# Patient Record
(demographics unavailable — no encounter records)

---

## 2024-10-15 NOTE — CARDIOLOGY SUMMARY
[de-identified] : December 23, 2022 sinus rhythm October 15, 2024 sinus rhythm low voltage precordium [de-identified] : 2023 sinus rhythm [de-identified] : January 2023 nuclear stress [de-identified] : December 2022 normal LVEF

## 2024-10-15 NOTE — HISTORY OF PRESENT ILLNESS
[FreeTextEntry1] :  She is diagnosed with hypertension hyperlipidemia and diabetes.  Seen today with her .    Physically she is feeling well.  Occasional left shoulder discomfort when carrying a bag in her left shoulder but otherwise when she walks she has no discomforts.  Shortness of breath only if climbing 3-4 flights of stairs.

## 2024-10-15 NOTE — PHYSICAL EXAM
[Well Developed] : well developed [Well Nourished] : well nourished [No Edema] : no edema [Normal PT B/L] : normal PT B/L [Normal] : moves all extremities, no focal deficits, normal speech [Alert and Oriented] : alert and oriented

## 2024-10-15 NOTE — DISCUSSION/SUMMARY
[Patient] : the patient [Risks] : risks [Benefits] : benefits [Alternatives] : alternatives [___ Month(s)] : in [unfilled] month(s) [FreeTextEntry1] : From cardiac viewpoint advised regarding diet exercise and weight loss.  Maintain Diovan, amlodipine and Zetia.  Diabetic management per Dr. Morris.  Questions addressed with patient and . [EKG obtained to assist in diagnosis and management of assessed problem(s)] : EKG obtained to assist in diagnosis and management of assessed problem(s)

## 2025-03-24 NOTE — HEALTH RISK ASSESSMENT
[Excellent] : ~his/her~  mood as  excellent [Yes] : Yes [2 - 3 times a week (3 pts)] : 2 - 3  times a week (3 points) [1 or 2 (0 pts)] : 1 or 2 (0 points) [Never (0 pts)] : Never (0 points) [No] : In the past 12 months have you used drugs other than those required for medical reasons? No [No falls in past year] : Patient reported no falls in the past year [0] : 2) Feeling down, depressed, or hopeless: Not at all (0) [Never] : Never [None] : None [With Significant Other] : lives with significant other [Employed] : employed [] :  [Sexually Active] : sexually active [Feels Safe at Home] : Feels safe at home [Fully functional (bathing, dressing, toileting, transferring, walking, feeding)] : Fully functional (bathing, dressing, toileting, transferring, walking, feeding) [Fully functional (using the telephone, shopping, preparing meals, housekeeping, doing laundry, using] : Fully functional and needs no help or supervision to perform IADLs (using the telephone, shopping, preparing meals, housekeeping, doing laundry, using transportation, managing medications and managing finances) [Reports normal functional visual acuity (ie: able to read med bottle)] : Reports normal functional visual acuity [Smoke Detector] : smoke detector [Seat Belt] :  uses seat belt [Sunscreen] : uses sunscreen [de-identified] : walking [de-identified] : regular diet [[id number]] : 0 [Change in mental status noted] : No change in mental status noted [Language] : denies difficulty with language [Behavior] : denies difficulty with behavior [Reports changes in hearing] : Reports no changes in hearing [Reports changes in vision] : Reports no changes in vision [Reports changes in dental health] : Reports no changes in dental health [Travel to Developing Areas] : does not  travel to developing areas [MammogramDate] : 2023 [ColonoscopyDate] : 2023 [ColonoscopyComments] : Dr. Vera- [facility]  [de-identified] : pt. wears glasses

## 2025-03-24 NOTE — PLAN
[FreeTextEntry1] : Allergy referral. Pneumo vaccine. Medications renewed. Stopped Omperazole, pepcid.

## 2025-05-01 NOTE — PHYSICAL EXAM
[de-identified] : General: No distress, well nourished Eyes: Normal Sclera, EOMI, PERRL ENT: Normal appearance of the nose, normal oropharynx Neck/Thyroid: No cervical lymphadenopathy, thyroid gland 20 g in size, no thyroid nodules, non-tender Respiratory: No use of accessory muscles of respiration, vesicular breath sounds heard bilaterally, no crepitations or rhonchi Cardiovascular: S1 and S2 heard and normal, no S3 or S4, no murmurs, radial pulse normal bilaterally Abdomen: soft, non-tender, no masses, normal bowel sounds Musculoskeletal: No swelling or deformities of joints of hands, no pedal edema Neurological: Normal range of motion in the hands, Normal brachioradialis reflexes bilaterally Psychiatry: Patient converses normally, good judgement and insight Skin: No rashes in hands, no nodules palpated in hands  [de-identified] :  DM foot exam done on 05/01/2025: No ulcers seen in feet Dorsalis pedis pulses normal bilaterally Sensation to 10 g monofilament normal in feet bilaterally

## 2025-05-01 NOTE — HISTORY OF PRESENT ILLNESS
[FreeTextEntry1] : Problems: 1. DM type 2 2. Hypertension 3. Hyperlipidemia   DM type 2 1. Diagnosed in 2020 2. Meds: Metformin  mg po bid - no side effects Liraglutide 1.8 mcg sc once daily - no pancreatitis, no personal or family history of medullary thyroid cancer Had genital candidiasis with Jardiance.  3. Fingersticks done once per month - 110s, no hypoglycemic unawareness 4. On Aspirin 81 mg po daily, not on statin, on ezetimibe 10 mg po daily - I DISCONTINUED EZETIMIBE ON 05/01/2025 AND I PRESCRIBED ATORVASTATIN 20 MG PO DAILY ON 05/01/2025, on valsartan 320 mg po daily 5. Complications: No DM nephropathy (normal creatinine) No DM retinopathy (last eye exam was in Sept 2024, patient advised on the need for annual DM eye exam) No ASCVD No foot ulcers/amputation 6. Patient quit smoking cigarettes in 2008

## 2025-05-01 NOTE — ASSESSMENT
[FreeTextEntry1] : Target: HbA1c < 7%, BP < 130/80  HbA1c is above goal and patient is obese - I recommended she switch from liraglutide to Mounjaro but she says she will research this some more and probably consent to starting this at next visit.  Patient did not want to adjust her DM regimen at this time - she is to improve her diet and exercise habits to reduce her HbA1c level.  BP is above goal but I will monitor this for now  Patient is on Aspirin and ARB - I DISCONTINUED EZETIMIBE ON 05/01/2025 AND I PRESCRIBED ATORVASTATIN.   Last lipid panel - March 2024 - LDL 79, Trig 94 Last HbA1c - 05/01/2025 7.9% Last Vitamin B12 - None seen Last urine albumin panel - None seen Last BMP/CMP - March 2024 - Cr N, K N, AST N, ALT 52 (10 to 45)   Plan: 1. Discontinue Ezetimibe 2. Start atorvastatin 20 mg po daily 3. Fingersticks to be done once daily - patient does not want to use the CGM for now 4. Labs to be done in 2 months - see below 5. Follow up in 2 months to review results and meter

## 2025-07-01 NOTE — ASSESSMENT
[FreeTextEntry1] : Target: HbA1c < 7%, BP < 130/80  HbA1c is above goal and patient is obese so I switched from liraglutide to Mounjaro. BP is above goal but I will monitor this for now as patient did not use her valsartan for 3 weeks - patient advised on the need to be compliant with the valsartan.   Patient is on Aspirin and ARB and statin.   Last lipid panel - June 2025 - LDL 28, Trig 65 Last HbA1c - 06/26/2025 - 8. 4% Last Vitamin B12 - June 2025  - N Last urine albumin panel - June 2025 - Neg Last BMP/CMP - June 2025 - Cr N, K N, AST N, ALT 54 (10 to 45)   Plan: 1. Discontinue Liraglutide 2. Start Mounjaro 2.5 mg sc once weekly for four weeks and then increase to 5 mg sc once weekly  3. Fingersticks to be done once daily - patient does not want to use the CGM for now 4. Labs to be done in 2 months - see below 5. Follow up in 2 months to review results and meter - patient wishes to FOLLOW UP IN 2 MONTHS

## 2025-07-01 NOTE — PHYSICAL EXAM
[de-identified] : General: No distress, well nourished Eyes: Normal Sclera, EOMI, PERRL ENT: Normal appearance of the nose, normal oropharynx Neck/Thyroid: No cervical lymphadenopathy, thyroid gland 20 g in size, no thyroid nodules, non-tender Respiratory: No use of accessory muscles of respiration, vesicular breath sounds heard bilaterally, no crepitations or rhonchi Cardiovascular: S1 and S2 heard and normal, no S3 or S4, no murmurs, radial pulse normal bilaterally Abdomen: soft, non-tender, no masses, normal bowel sounds Musculoskeletal: No swelling or deformities of joints of hands, no pedal edema Neurological: Normal range of motion in the hands, Normal brachioradialis reflexes bilaterally Psychiatry: Patient converses normally, good judgement and insight Skin: No rashes in hands, no nodules palpated in hands  [de-identified] :  DM foot exam done on 05/01/2025: No ulcers seen in feet Dorsalis pedis pulses normal bilaterally Sensation to 10 g monofilament normal in feet bilaterally

## 2025-07-01 NOTE — HISTORY OF PRESENT ILLNESS
[FreeTextEntry1] : Problems: 1. DM type 2 2. Hypertension 3. Hyperlipidemia   DM type 2 1. Diagnosed in 2020 2. Meds: Metformin  mg po bid - no side effects Liraglutide 1.8 mcg sc once daily - no pancreatitis, no personal or family history of medullary thyroid cancer Had genital candidiasis with Jardiance.  3. Fingersticks not done, no hypoglycemic unawareness 4. On Aspirin 81 mg po daily, on atorvastatin 20 mg po daily, on valsartan 320 mg po daily 5. Complications: No DM nephropathy (normal creatinine, neg urine microalbumin panel in June 2025) No DM retinopathy (last eye exam was in Sept 2024, patient advised on the need for annual DM eye exam) No ASCVD No foot ulcers/amputation 6. Patient quit smoking cigarettes in 2008